# Patient Record
Sex: FEMALE | Race: WHITE | NOT HISPANIC OR LATINO | URBAN - NONMETROPOLITAN AREA
[De-identification: names, ages, dates, MRNs, and addresses within clinical notes are randomized per-mention and may not be internally consistent; named-entity substitution may affect disease eponyms.]

---

## 2019-06-03 ENCOUNTER — IMPORTED ENCOUNTER (OUTPATIENT)
Dept: URBAN - NONMETROPOLITAN AREA CLINIC 1 | Facility: CLINIC | Age: 64
End: 2019-06-03

## 2019-06-03 PROBLEM — H52.223: Noted: 2019-06-03

## 2019-06-03 PROBLEM — H52.4: Noted: 2019-06-03

## 2019-06-03 PROBLEM — H25.13: Noted: 2019-06-03

## 2019-06-03 PROBLEM — H52.03: Noted: 2019-06-03

## 2019-06-03 PROCEDURE — S0620 ROUTINE OPHTHALMOLOGICAL EXA: HCPCS

## 2019-06-03 NOTE — PATIENT DISCUSSION
Hyperopia OU-Discussed diagnosis with patient. Astigmatism OU-Discussed diagnosis with patient. Presbyopia OU-Discussed diagnosis with patient. Updated spec Rx given. Recommend lens that will provide comfort as well as protect safety and health of eyes. Cataract OU-Not yet surgical. -Reviewed symptoms of advancing cataract growth such as glare and halos and decreased vision.-Continue to monitor for now. Pt will notify us if any new symptoms develop.

## 2020-12-22 ENCOUNTER — IMPORTED ENCOUNTER (OUTPATIENT)
Dept: URBAN - NONMETROPOLITAN AREA CLINIC 1 | Facility: CLINIC | Age: 65
End: 2020-12-22

## 2020-12-22 PROBLEM — H52.03: Noted: 2020-12-22

## 2020-12-22 PROBLEM — H25.13: Noted: 2020-12-22

## 2020-12-22 PROBLEM — H00.14: Noted: 2020-12-22

## 2020-12-22 PROBLEM — H52.223: Noted: 2020-12-22

## 2020-12-22 PROBLEM — H52.4: Noted: 2020-12-22

## 2020-12-22 PROCEDURE — 67800 REMOVE EYELID LESION: CPT

## 2020-12-22 NOTE — PATIENT DISCUSSION
Chalazion:-Patient has been using warm compresses BID as well as artificial tears OU QID without relief from symptoms. Risks benefits and alternatives to I&D of chalazion discussed with patient today. Patient elects to proceed with surgery today. Completed without difficulty today. ***********Previous exam notes*************Hyperopia OU-Discussed diagnosis with patient. Astigmatism OU-Discussed diagnosis with patient. Presbyopia OU-Discussed diagnosis with patient. Updated spec Rx given. Recommend lens that will provide comfort as well as protect safety and health of eyes. Cataract OU-Not yet surgical. -Reviewed symptoms of advancing cataract growth such as glare and halos and decreased vision.-Continue to monitor for now. Pt will notify us if any new symptoms develop. ""

## 2021-01-11 ENCOUNTER — IMPORTED ENCOUNTER (OUTPATIENT)
Dept: URBAN - NONMETROPOLITAN AREA CLINIC 1 | Facility: CLINIC | Age: 66
End: 2021-01-11

## 2021-01-11 PROBLEM — H52.223: Noted: 2021-01-11

## 2021-01-11 PROBLEM — H52.4: Noted: 2021-01-11

## 2021-01-11 PROBLEM — H25.13: Noted: 2021-01-11

## 2021-01-11 PROBLEM — H52.03: Noted: 2021-01-11

## 2021-01-11 PROCEDURE — 99214 OFFICE O/P EST MOD 30 MIN: CPT

## 2021-01-11 PROCEDURE — 92015 DETERMINE REFRACTIVE STATE: CPT

## 2021-01-11 NOTE — PATIENT DISCUSSION
Cataract OU-Not yet surgical. -Reviewed symptoms of advancing cataract growth such as glare and halos and decreased vision.-Continue to monitor for now. Pt will notify us if any new symptoms develop. Hyperopia OU-Discussed diagnosis with patient. Astigmatism OU-Discussed diagnosis with patient. Presbyopia OU-Discussed diagnosis with patient. Updated spec Rx given. Recommend lens that will provide comfort as well as protect safety and health of eyes. Chalazion FUNMI-Chalazion has resolved. Recommend observation.

## 2022-03-01 ENCOUNTER — ESTABLISHED PATIENT (OUTPATIENT)
Dept: RURAL CLINIC 3 | Facility: CLINIC | Age: 67
End: 2022-03-01

## 2022-03-01 DIAGNOSIS — H52.223: ICD-10-CM

## 2022-03-01 DIAGNOSIS — H52.4: ICD-10-CM

## 2022-03-01 DIAGNOSIS — H52.03: ICD-10-CM

## 2022-03-01 DIAGNOSIS — H25.813: ICD-10-CM

## 2022-03-01 PROCEDURE — 92015 DETERMINE REFRACTIVE STATE: CPT

## 2022-03-01 PROCEDURE — 99214 OFFICE O/P EST MOD 30 MIN: CPT

## 2022-03-01 ASSESSMENT — VISUAL ACUITY
OD_SC: 20/25-1
OS_SC: 20/25

## 2022-03-01 ASSESSMENT — TONOMETRY
OS_IOP_MMHG: 15
OD_IOP_MMHG: 16

## 2022-04-16 ASSESSMENT — VISUAL ACUITY
OS_CC: 20/50-
OD_SC: 20/25 -2
OS_SC: 20/30 -1
OD_CC: 20/30-1
OD_SC: 20/25
OS_SC: 20/25-2

## 2022-04-16 ASSESSMENT — TONOMETRY
OS_IOP_MMHG: 16
OD_IOP_MMHG: 16
OD_IOP_MMHG: 14
OS_IOP_MMHG: 14

## 2023-05-01 ENCOUNTER — CONSULTATION/EVALUATION (OUTPATIENT)
Dept: RURAL CLINIC 3 | Facility: CLINIC | Age: 68
End: 2023-05-01

## 2023-05-01 DIAGNOSIS — H25.813: ICD-10-CM

## 2023-05-01 PROCEDURE — 99214 OFFICE O/P EST MOD 30 MIN: CPT

## 2023-05-01 ASSESSMENT — VISUAL ACUITY
OS_CC: 20/80
OD_SC: 20/30
OD_CC: 20/25-2
OS_PH: 20/30
OD_PAM: 20/25
OD_CC: 20/40
OS_SC: 20/200
OS_BAT: 20/25
OD_BAT: 20/40
OS_CC: 20/70
OS_AM: 20/30

## 2023-05-01 ASSESSMENT — TONOMETRY
OS_IOP_MMHG: 12
OD_IOP_MMHG: 12

## 2023-06-27 ENCOUNTER — PRE-OP/H&P (OUTPATIENT)
Dept: RURAL CLINIC 3 | Facility: CLINIC | Age: 68
End: 2023-06-27

## 2023-06-27 VITALS
DIASTOLIC BLOOD PRESSURE: 94 MMHG | BODY MASS INDEX: 40.47 KG/M2 | WEIGHT: 240 LBS | SYSTOLIC BLOOD PRESSURE: 162 MMHG | HEIGHT: 64.5 IN | HEART RATE: 68 BPM

## 2023-06-27 DIAGNOSIS — Z01.818: ICD-10-CM

## 2023-06-27 PROCEDURE — 99213 OFFICE O/P EST LOW 20 MIN: CPT

## 2023-06-27 ASSESSMENT — VISUAL ACUITY
OS_AM: 20/30
OD_SC: 20/30
OS_PH: 20/30
OD_CC: 20/25-2
OD_PAM: 20/25
OS_BAT: 20/25
OD_CC: 20/40
OS_CC: 20/80
OS_CC: 20/70
OD_BAT: 20/40
OS_SC: 20/200

## 2023-07-20 ENCOUNTER — POST OP/EVAL OF SECOND EYE (OUTPATIENT)
Dept: RURAL CLINIC 3 | Facility: CLINIC | Age: 68
End: 2023-07-20

## 2023-07-20 ENCOUNTER — PRE-OP/H&P (OUTPATIENT)
Dept: RURAL CLINIC 3 | Facility: CLINIC | Age: 68
End: 2023-07-20

## 2023-07-20 ENCOUNTER — SURGERY/PROCEDURE (OUTPATIENT)
Dept: RURAL CLINIC 3 | Facility: CLINIC | Age: 68
End: 2023-07-20

## 2023-07-20 VITALS
DIASTOLIC BLOOD PRESSURE: 72 MMHG | BODY MASS INDEX: 40.47 KG/M2 | HEART RATE: 83 BPM | HEIGHT: 64.5 IN | SYSTOLIC BLOOD PRESSURE: 134 MMHG | WEIGHT: 240 LBS

## 2023-07-20 DIAGNOSIS — H25.812: ICD-10-CM

## 2023-07-20 DIAGNOSIS — H25.811: ICD-10-CM

## 2023-07-20 DIAGNOSIS — Z01.818: ICD-10-CM

## 2023-07-20 PROCEDURE — 66984 XCAPSL CTRC RMVL W/O ECP: CPT

## 2023-07-20 PROCEDURE — 68841 INSJ RX ELUT IMPLT LAC CANAL: CPT

## 2023-07-20 PROCEDURE — 99213 OFFICE O/P EST LOW 20 MIN: CPT

## 2023-07-20 ASSESSMENT — TONOMETRY
OS_IOP_MMHG: 20
OD_IOP_MMHG: 12

## 2023-07-20 ASSESSMENT — VISUAL ACUITY
OD_CC: 20/40
OS_SC: 20/100
OD_CC: 20/25-2
OD_BAT: 20/40
OD_CC: 20/25
OD_CC: 20/40
OD_SC: 20/30
OD_BAT: 20/40
OS_AM: 20/70
OD_PAM: 20/25
OD_SC: 20/30
OD_PAM: 20/25

## 2023-07-25 ENCOUNTER — POST-OP (OUTPATIENT)
Dept: RURAL CLINIC 3 | Facility: CLINIC | Age: 68
End: 2023-07-25

## 2023-07-25 DIAGNOSIS — Z98.42: ICD-10-CM

## 2023-07-25 PROCEDURE — 99024 POSTOP FOLLOW-UP VISIT: CPT

## 2023-07-25 ASSESSMENT — VISUAL ACUITY
OD_CC: 20/40
OD_SC: 20/30
OD_BAT: 20/40
OD_PAM: 20/25
OS_SC: 20/30-2
OD_CC: 20/25

## 2023-07-25 ASSESSMENT — TONOMETRY
OD_IOP_MMHG: 14
OS_IOP_MMHG: 14

## 2023-08-03 ENCOUNTER — SURGERY/PROCEDURE (OUTPATIENT)
Dept: RURAL CLINIC 3 | Facility: CLINIC | Age: 68
End: 2023-08-03

## 2023-08-03 ENCOUNTER — POST-OP (OUTPATIENT)
Dept: RURAL CLINIC 3 | Facility: CLINIC | Age: 68
End: 2023-08-03

## 2023-08-03 DIAGNOSIS — H25.811: ICD-10-CM

## 2023-08-03 DIAGNOSIS — Z98.42: ICD-10-CM

## 2023-08-03 DIAGNOSIS — Z98.41: ICD-10-CM

## 2023-08-03 PROCEDURE — 66984 XCAPSL CTRC RMVL W/O ECP: CPT

## 2023-08-03 PROCEDURE — 99024 POSTOP FOLLOW-UP VISIT: CPT

## 2023-08-03 PROCEDURE — 68841 INSJ RX ELUT IMPLT LAC CANAL: CPT

## 2023-08-03 ASSESSMENT — TONOMETRY
OS_IOP_MMHG: 15
OD_IOP_MMHG: 18

## 2023-08-03 ASSESSMENT — VISUAL ACUITY
OS_SC: 20/25
OD_SC: 20/100

## 2023-08-16 ENCOUNTER — POST-OP (OUTPATIENT)
Dept: RURAL CLINIC 3 | Facility: CLINIC | Age: 68
End: 2023-08-16

## 2023-08-16 DIAGNOSIS — Z98.41: ICD-10-CM

## 2023-08-16 DIAGNOSIS — Z98.42: ICD-10-CM

## 2023-08-16 PROCEDURE — 99024 POSTOP FOLLOW-UP VISIT: CPT

## 2023-08-16 ASSESSMENT — VISUAL ACUITY
OD_SC: 20/25-1
OS_SC: 20/30

## 2023-08-16 ASSESSMENT — TONOMETRY
OS_IOP_MMHG: 17
OD_IOP_MMHG: 17

## 2023-09-19 ENCOUNTER — POST-OP (OUTPATIENT)
Dept: RURAL CLINIC 3 | Facility: CLINIC | Age: 68
End: 2023-09-19

## 2023-09-19 DIAGNOSIS — Z98.41: ICD-10-CM

## 2023-09-19 DIAGNOSIS — Z98.42: ICD-10-CM

## 2023-09-19 DIAGNOSIS — H52.4: ICD-10-CM

## 2023-09-19 PROCEDURE — 99024 POSTOP FOLLOW-UP VISIT: CPT

## 2023-09-19 PROCEDURE — 92015 DETERMINE REFRACTIVE STATE: CPT

## 2023-09-19 ASSESSMENT — TONOMETRY
OS_IOP_MMHG: 13
OD_IOP_MMHG: 13

## 2023-09-19 ASSESSMENT — VISUAL ACUITY
OS_SC: 20/25-1
OD_SC: 20/25-1

## 2023-12-19 ENCOUNTER — FOLLOW UP (OUTPATIENT)
Dept: RURAL CLINIC 3 | Facility: CLINIC | Age: 68
End: 2023-12-19

## 2023-12-19 DIAGNOSIS — Z96.1: ICD-10-CM

## 2023-12-19 PROCEDURE — 99213 OFFICE O/P EST LOW 20 MIN: CPT

## 2023-12-19 ASSESSMENT — VISUAL ACUITY
OD_SC: 20/30-2
OS_SC: 20/25

## 2023-12-19 ASSESSMENT — TONOMETRY
OD_IOP_MMHG: 16
OS_IOP_MMHG: 16

## 2024-10-30 ENCOUNTER — COMPREHENSIVE EXAM (OUTPATIENT)
Dept: RURAL CLINIC 3 | Facility: CLINIC | Age: 69
End: 2024-10-30

## 2024-10-30 DIAGNOSIS — H52.4: ICD-10-CM

## 2024-10-30 PROCEDURE — 92015 DETERMINE REFRACTIVE STATE: CPT

## 2024-10-30 PROCEDURE — 92014 COMPRE OPH EXAM EST PT 1/>: CPT

## 2025-05-14 ENCOUNTER — COMPREHENSIVE EXAM (OUTPATIENT)
Age: 70
End: 2025-05-14

## 2025-05-14 DIAGNOSIS — Z96.1: ICD-10-CM

## 2025-05-14 DIAGNOSIS — E11.9: ICD-10-CM

## 2025-05-14 DIAGNOSIS — H26.491: ICD-10-CM

## 2025-05-14 PROCEDURE — 92250 FUNDUS PHOTOGRAPHY W/I&R: CPT

## 2025-05-14 PROCEDURE — 99214 OFFICE O/P EST MOD 30 MIN: CPT
